# Patient Record
Sex: MALE | Race: WHITE | URBAN - METROPOLITAN AREA
[De-identification: names, ages, dates, MRNs, and addresses within clinical notes are randomized per-mention and may not be internally consistent; named-entity substitution may affect disease eponyms.]

---

## 2021-03-04 ENCOUNTER — TRANSFERRED RECORDS (OUTPATIENT)
Dept: HEALTH INFORMATION MANAGEMENT | Facility: CLINIC | Age: 61
End: 2021-03-04

## 2021-03-04 LAB — HEP C HIM: NORMAL

## 2021-03-06 ENCOUNTER — TRANSFERRED RECORDS (OUTPATIENT)
Dept: HEALTH INFORMATION MANAGEMENT | Facility: CLINIC | Age: 61
End: 2021-03-06

## 2021-03-15 ENCOUNTER — TRANSFERRED RECORDS (OUTPATIENT)
Dept: HEALTH INFORMATION MANAGEMENT | Facility: CLINIC | Age: 61
End: 2021-03-15

## 2021-04-09 ENCOUNTER — TRANSFERRED RECORDS (OUTPATIENT)
Dept: HEALTH INFORMATION MANAGEMENT | Facility: CLINIC | Age: 61
End: 2021-04-09

## 2021-05-05 ENCOUNTER — TRANSFERRED RECORDS (OUTPATIENT)
Dept: HEALTH INFORMATION MANAGEMENT | Facility: CLINIC | Age: 61
End: 2021-05-05

## 2021-05-05 LAB
ALT SERPL-CCNC: 61 U/L (ref 0–78)
AST SERPL-CCNC: 69 U/L (ref 0–37)

## 2021-05-25 ENCOUNTER — REFERRAL (OUTPATIENT)
Dept: TRANSPLANT | Facility: CLINIC | Age: 61
End: 2021-05-25

## 2021-05-25 DIAGNOSIS — K74.60 CIRRHOSIS OF LIVER (H): Primary | ICD-10-CM

## 2021-05-25 DIAGNOSIS — K81.9 CHOLECYSTITIS: ICD-10-CM

## 2021-05-25 NOTE — TELEPHONE ENCOUNTER
Referral from Dr. Arenas at [facility] (see CE) for possible choley in cirrhotic patient.     Will ask Dr. Flowers's admins to call and set up appts.    Records are in CE.

## 2021-05-25 NOTE — Clinical Note
[address]TH Floor admins to determine exact date of consult. They will contact us with date for consult. Hold tight until then. Thanks, tk

## 2021-06-14 ENCOUNTER — ANCILLARY PROCEDURE (OUTPATIENT)
Dept: ULTRASOUND IMAGING | Facility: CLINIC | Age: 61
End: 2021-06-14
Attending: TRANSPLANT SURGERY
Payer: COMMERCIAL

## 2021-06-14 ENCOUNTER — OFFICE VISIT (OUTPATIENT)
Dept: TRANSPLANT | Facility: CLINIC | Age: 61
End: 2021-06-14
Attending: TRANSPLANT SURGERY
Payer: COMMERCIAL

## 2021-06-14 VITALS
WEIGHT: 266.6 LBS | SYSTOLIC BLOOD PRESSURE: 148 MMHG | OXYGEN SATURATION: 93 % | DIASTOLIC BLOOD PRESSURE: 82 MMHG | BODY MASS INDEX: 37.18 KG/M2 | HEART RATE: 74 BPM

## 2021-06-14 DIAGNOSIS — K81.9 CHOLECYSTITIS: ICD-10-CM

## 2021-06-14 DIAGNOSIS — K74.60 CIRRHOSIS OF LIVER (H): ICD-10-CM

## 2021-06-14 LAB
ALBUMIN SERPL-MCNC: 3.4 G/DL (ref 3.4–5)
ALP SERPL-CCNC: 141 U/L (ref 40–150)
ALT SERPL W P-5'-P-CCNC: 49 U/L (ref 0–70)
ANION GAP SERPL CALCULATED.3IONS-SCNC: <1 MMOL/L (ref 3–14)
AST SERPL W P-5'-P-CCNC: 58 U/L (ref 0–45)
BILIRUB DIRECT SERPL-MCNC: 0.3 MG/DL (ref 0–0.2)
BILIRUB SERPL-MCNC: 1 MG/DL (ref 0.2–1.3)
BUN SERPL-MCNC: 6 MG/DL (ref 7–30)
CALCIUM SERPL-MCNC: 8.6 MG/DL (ref 8.5–10.1)
CHLORIDE SERPL-SCNC: 107 MMOL/L (ref 94–109)
CO2 SERPL-SCNC: 31 MMOL/L (ref 20–32)
CREAT SERPL-MCNC: 0.8 MG/DL (ref 0.66–1.25)
ERYTHROCYTE [DISTWIDTH] IN BLOOD BY AUTOMATED COUNT: 13.1 % (ref 10–15)
GFR SERPL CREATININE-BSD FRML MDRD: >90 ML/MIN/{1.73_M2}
GLUCOSE SERPL-MCNC: 115 MG/DL (ref 70–99)
HCT VFR BLD AUTO: 40.2 % (ref 40–53)
HGB BLD-MCNC: 13.6 G/DL (ref 13.3–17.7)
INR PPP: 1.28 (ref 0.86–1.14)
MCH RBC QN AUTO: 30.8 PG (ref 26.5–33)
MCHC RBC AUTO-ENTMCNC: 33.8 G/DL (ref 31.5–36.5)
MCV RBC AUTO: 91 FL (ref 78–100)
PLATELET # BLD AUTO: 79 10E9/L (ref 150–450)
POTASSIUM SERPL-SCNC: 3.6 MMOL/L (ref 3.4–5.3)
PROT SERPL-MCNC: 7.7 G/DL (ref 6.8–8.8)
RBC # BLD AUTO: 4.41 10E12/L (ref 4.4–5.9)
SODIUM SERPL-SCNC: 136 MMOL/L (ref 133–144)
WBC # BLD AUTO: 5.9 10E9/L (ref 4–11)

## 2021-06-14 PROCEDURE — 93975 VASCULAR STUDY: CPT | Performed by: RADIOLOGY

## 2021-06-14 PROCEDURE — 99203 OFFICE O/P NEW LOW 30 MIN: CPT | Performed by: TRANSPLANT SURGERY

## 2021-06-14 PROCEDURE — 85610 PROTHROMBIN TIME: CPT | Performed by: PATHOLOGY

## 2021-06-14 PROCEDURE — 36415 COLL VENOUS BLD VENIPUNCTURE: CPT | Performed by: PATHOLOGY

## 2021-06-14 PROCEDURE — 85027 COMPLETE CBC AUTOMATED: CPT | Performed by: PATHOLOGY

## 2021-06-14 PROCEDURE — 80076 HEPATIC FUNCTION PANEL: CPT | Performed by: PATHOLOGY

## 2021-06-14 PROCEDURE — 80048 BASIC METABOLIC PNL TOTAL CA: CPT | Performed by: PATHOLOGY

## 2021-06-14 RX ORDER — URSODIOL 300 MG/1
300 CAPSULE ORAL 2 TIMES DAILY
Qty: 90 CAPSULE | Refills: 3 | Status: SHIPPED | OUTPATIENT
Start: 2021-06-14 | End: 2021-07-22

## 2021-06-14 RX ORDER — PANTOPRAZOLE SODIUM 40 MG/1
TABLET, DELAYED RELEASE ORAL
COMMUNITY
Start: 2021-04-19

## 2021-06-14 NOTE — LETTER
6/14/2021         RE: Marc Hart  [address] [facility] [address]        Dear Colleague,    Thank you for referring your patient, Marc Hart, to the [facility] TRANSPLANT [facility]. Please see a copy of my visit note below.    Patient has been referred for a cholecystectomy.    Complaints: Upper abdominal discomfort on eating fatty food.      History of present illness:    Patient has noticed that he has upper abdominal discomfort when he eats food containing high content of fat.  This discomfort has been going on for almost a year.  On one occasion he had to go to the emergency room and was noted to have gallstones.  He does not give any history of fever suggesting cholangitis.  He does not give any history of pancreatitis.  He was noted to have liver disease, reported as nonalcoholic fatty steatosis.  There is no history of ascites or variceal bleeding or encephalopathy.    No past medical history on file.  No past surgical history on file.  No past surgical history on file.    Patient Active Problem List   Diagnosis     Rotator cuff strain, left, initial encounter       Current Outpatient Medications   Medication Sig Dispense Refill     ibuprofen (ADVIL,MOTRIN) 200 MG tablet Take 800 mg by mouth every 6 hours as needed for mild pain       pantoprazole (PROTONIX) 40 MG EC tablet        ursodiol (ACTIGALL) 300 MG capsule Take 1 capsule (300 mg) by mouth 2 times daily 90 capsule 3     doxycycline (VIBRAMYCIN) 100 MG capsule Take 1 capsule (100 mg) by mouth 2 times daily With food (Patient not taking: Reported on 6/14/2021) 20 capsule 0     Tadalafil (CIALIS PO) Take 5 mg by mouth          No Known Allergies    Vitals:    06/14/21 0932   BP: (!) 148/82   Pulse: 74   SpO2: 93%   Weight: 120.9 kg (266 lb 9.6 oz)     Examination abdomen:    The abdomen is soft no guarding tenderness or rigidity.      Clinical impression: Upper abdominal discomfort suspected due to cholelithiasis.    Recommendations:  Ultrasound of the liver, start ursodiol 1 tablet twice daily.    I explained to him the risk benefits and alternatives of performing an open cholecystectomy in a patient with cirrhosis.  The patient wants to try medical therapy and only if his symptoms get worse he wants to pursue surgery.  I gave him my contact information and told him to report if he has any further pain fever or any other symptoms.          Again, thank you for allowing me to participate in the care of your patient.        Sincerely,        Saeid Flowers MD

## 2021-06-14 NOTE — LETTER
Date:July 1, 2021      Patient was self referred, no letter generated. Do not send.        [facility] Health Information

## 2021-06-14 NOTE — PROGRESS NOTES
Patient has been referred for a cholecystectomy.    Complaints: Upper abdominal discomfort on eating fatty food.      History of present illness:    Patient has noticed that he has upper abdominal discomfort when he eats food containing high content of fat.  This discomfort has been going on for almost a year.  On one occasion he had to go to the emergency room and was noted to have gallstones.  He does not give any history of fever suggesting cholangitis.  He does not give any history of pancreatitis.  He was noted to have liver disease, reported as nonalcoholic fatty steatosis.  There is no history of ascites or variceal bleeding or encephalopathy.    No past medical history on file.  No past surgical history on file.  No past surgical history on file.    Patient Active Problem List   Diagnosis     Rotator cuff strain, left, initial encounter       Current Outpatient Medications   Medication Sig Dispense Refill     ibuprofen (ADVIL,MOTRIN) 200 MG tablet Take 800 mg by mouth every 6 hours as needed for mild pain       pantoprazole (PROTONIX) 40 MG EC tablet        ursodiol (ACTIGALL) 300 MG capsule Take 1 capsule (300 mg) by mouth 2 times daily 90 capsule 3     doxycycline (VIBRAMYCIN) 100 MG capsule Take 1 capsule (100 mg) by mouth 2 times daily With food (Patient not taking: Reported on 6/14/2021) 20 capsule 0     Tadalafil (CIALIS PO) Take 5 mg by mouth          No Known Allergies    Vitals:    06/14/21 0932   BP: (!) 148/82   Pulse: 74   SpO2: 93%   Weight: 120.9 kg (266 lb 9.6 oz)     Examination abdomen:    The abdomen is soft no guarding tenderness or rigidity.      Clinical impression: Upper abdominal discomfort suspected due to cholelithiasis.    Recommendations: Ultrasound of the liver, start ursodiol 1 tablet twice daily.    I explained to him the risk benefits and alternatives of performing an open cholecystectomy in a patient with cirrhosis.  The patient wants to try medical therapy and only if his  symptoms get worse he wants to pursue surgery.  I gave him my contact information and told him to report if he has any further pain fever or any other symptoms.

## 2021-07-22 DIAGNOSIS — K81.9 CHOLECYSTITIS: ICD-10-CM

## 2021-07-22 RX ORDER — URSODIOL 300 MG/1
300 CAPSULE ORAL 2 TIMES DAILY
Qty: 90 CAPSULE | Refills: 1 | Status: SHIPPED | OUTPATIENT
Start: 2021-07-22

## 2023-11-30 ENCOUNTER — TRANSFERRED RECORDS (OUTPATIENT)
Dept: HEALTH INFORMATION MANAGEMENT | Facility: CLINIC | Age: 63
End: 2023-11-30

## 2023-12-05 ENCOUNTER — TRANSFERRED RECORDS (OUTPATIENT)
Dept: HEALTH INFORMATION MANAGEMENT | Facility: CLINIC | Age: 63
End: 2023-12-05

## 2024-02-13 ENCOUNTER — TRANSFERRED RECORDS (OUTPATIENT)
Dept: HEALTH INFORMATION MANAGEMENT | Facility: CLINIC | Age: 64
End: 2024-02-13

## 2024-03-08 ENCOUNTER — TRANSFERRED RECORDS (OUTPATIENT)
Dept: HEALTH INFORMATION MANAGEMENT | Facility: CLINIC | Age: 64
End: 2024-03-08

## 2024-03-13 ENCOUNTER — TRANSFERRED RECORDS (OUTPATIENT)
Dept: HEALTH INFORMATION MANAGEMENT | Facility: CLINIC | Age: 64
End: 2024-03-13

## 2024-05-25 ENCOUNTER — TRANSFERRED RECORDS (OUTPATIENT)
Dept: HEALTH INFORMATION MANAGEMENT | Facility: CLINIC | Age: 64
End: 2024-05-25

## 2024-06-20 ENCOUNTER — TRANSFERRED RECORDS (OUTPATIENT)
Dept: HEALTH INFORMATION MANAGEMENT | Facility: CLINIC | Age: 64
End: 2024-06-20

## 2024-10-01 ENCOUNTER — TRANSFERRED RECORDS (OUTPATIENT)
Dept: HEALTH INFORMATION MANAGEMENT | Facility: CLINIC | Age: 64
End: 2024-10-01

## 2024-10-02 ENCOUNTER — TRANSFERRED RECORDS (OUTPATIENT)
Dept: HEALTH INFORMATION MANAGEMENT | Facility: CLINIC | Age: 64
End: 2024-10-02

## 2024-10-18 ENCOUNTER — TRANSFERRED RECORDS (OUTPATIENT)
Dept: HEALTH INFORMATION MANAGEMENT | Facility: CLINIC | Age: 64
End: 2024-10-18

## 2024-10-31 ENCOUNTER — TRANSFERRED RECORDS (OUTPATIENT)
Dept: HEALTH INFORMATION MANAGEMENT | Facility: CLINIC | Age: 64
End: 2024-10-31

## 2024-12-16 ENCOUNTER — TRANSFERRED RECORDS (OUTPATIENT)
Dept: HEALTH INFORMATION MANAGEMENT | Facility: CLINIC | Age: 64
End: 2024-12-16

## 2024-12-19 ENCOUNTER — TRANSFERRED RECORDS (OUTPATIENT)
Dept: HEALTH INFORMATION MANAGEMENT | Facility: CLINIC | Age: 64
End: 2024-12-19

## 2024-12-20 ENCOUNTER — TRANSFERRED RECORDS (OUTPATIENT)
Dept: HEALTH INFORMATION MANAGEMENT | Facility: CLINIC | Age: 64
End: 2024-12-20

## 2025-02-13 ENCOUNTER — TRANSFERRED RECORDS (OUTPATIENT)
Dept: HEALTH INFORMATION MANAGEMENT | Facility: CLINIC | Age: 65
End: 2025-02-13

## 2025-05-19 ENCOUNTER — MEDICAL CORRESPONDENCE (OUTPATIENT)
Dept: HEALTH INFORMATION MANAGEMENT | Facility: CLINIC | Age: 65
End: 2025-05-19
Payer: COMMERCIAL

## 2025-05-19 ENCOUNTER — TRANSFERRED RECORDS (OUTPATIENT)
Dept: HEALTH INFORMATION MANAGEMENT | Facility: CLINIC | Age: 65
End: 2025-05-19
Payer: COMMERCIAL

## 2025-05-21 ENCOUNTER — REFERRAL (OUTPATIENT)
Dept: TRANSPLANT | Facility: CLINIC | Age: 65
End: 2025-05-21

## 2025-05-21 DIAGNOSIS — K75.81 METABOLIC DYSFUNCTION-ASSOCIATED STEATOHEPATITIS (MASH): Primary | ICD-10-CM

## 2025-05-21 DIAGNOSIS — K75.81 LIVER CIRRHOSIS SECONDARY TO NASH (H): ICD-10-CM

## 2025-05-21 DIAGNOSIS — I85.00 IDIOPATHIC ESOPHAGEAL VARICES WITHOUT BLEEDING (H): ICD-10-CM

## 2025-05-21 DIAGNOSIS — K70.30 ALCOHOLIC CIRRHOSIS OF LIVER WITHOUT ASCITES (H): ICD-10-CM

## 2025-05-21 DIAGNOSIS — K70.31 ALCOHOLIC CIRRHOSIS OF LIVER WITH ASCITES (H): ICD-10-CM

## 2025-05-21 DIAGNOSIS — K74.60 LIVER CIRRHOSIS SECONDARY TO NASH (H): ICD-10-CM

## 2025-05-21 DIAGNOSIS — K76.6 PORTAL HYPERTENSION (H): ICD-10-CM

## 2025-05-22 VITALS — BODY MASS INDEX: 33.32 KG/M2 | HEIGHT: 71 IN | WEIGHT: 238 LBS

## 2025-05-28 PROBLEM — R19.5 GUAIAC POSITIVE STOOLS: Status: ACTIVE | Noted: 2023-06-13

## 2025-05-28 PROBLEM — I85.00 ESOPHAGEAL VARICES WITHOUT BLEEDING (H): Status: ACTIVE | Noted: 2025-03-25

## 2025-05-28 PROBLEM — K82.8 BILIARY DYSKINESIA: Status: ACTIVE | Noted: 2023-06-27

## 2025-05-28 PROBLEM — M48.02 IDIOPATHIC CERVICAL SPINAL STENOSIS: Status: ACTIVE | Noted: 2018-11-06

## 2025-05-28 PROBLEM — L02.611 CELLULITIS AND ABSCESS OF TOE OF RIGHT FOOT: Status: ACTIVE | Noted: 2024-07-11

## 2025-05-28 PROBLEM — D69.6 THROMBOCYTOPENIA: Status: ACTIVE | Noted: 2023-06-13

## 2025-05-28 PROBLEM — K76.6 PORTAL HYPERTENSION (H): Status: ACTIVE | Noted: 2021-03-15

## 2025-05-28 PROBLEM — K21.9 GASTROESOPHAGEAL REFLUX DISEASE WITHOUT ESOPHAGITIS: Status: ACTIVE | Noted: 2023-06-27

## 2025-05-28 PROBLEM — L03.031 CELLULITIS AND ABSCESS OF TOE OF RIGHT FOOT: Status: ACTIVE | Noted: 2024-07-11

## 2025-05-28 PROBLEM — F90.2 ATTENTION DEFICIT HYPERACTIVITY DISORDER (ADHD), COMBINED TYPE, MODERATE: Status: ACTIVE | Noted: 2024-11-05

## 2025-05-29 ENCOUNTER — PATIENT OUTREACH (OUTPATIENT)
Dept: CARE COORDINATION | Facility: CLINIC | Age: 65
End: 2025-05-29
Payer: COMMERCIAL

## 2025-05-31 ENCOUNTER — HEALTH MAINTENANCE LETTER (OUTPATIENT)
Age: 65
End: 2025-05-31

## 2025-06-05 NOTE — CONFIDENTIAL NOTE
DIAGNOSIS:  Alcoholic cirrhosis of liver without ascites    Appt Date:  06.013.2025   NOTES STATUS DETAILS   OFFICE NOTE from referring provider Internal 05.21.2025 Teresa Durant MD    OFFICE NOTES from other specialists Received 05.19.2025 Moisés Morin MD [facility]   DISCHARGE SUMMARY from hospital     MEDICATION LIST     LIVER BIOSPY (IF APPLICABLE)      PATHOLOGY REPORTS      IMAGING     ENDOSCOPY (IF AVAILABLE)     COLONOSCOPY (IF AVAILABLE)     ULTRASOUND LIVER     CT OF ABDOMEN     MRI OF LIVER     FIBROSCAN, US ELASTOGRAPHY, FIBROSIS SCAN, MR ELASTOGRAPHY     LABS     HEPATIC PANEL (LIVER PANEL)     BASIC METABOLIC PANEL     COMPLETE METABOLIC PANEL     COMPLETE BLOOD COUNT (CBC)     INTERNATIONAL NORMALIZED RATIO (INR)     HEPATITIS C ANTIBODY     HEPATITIS C VIRAL LOAD/PCR     HEPATITIS C GENOTYPE     HEPATITIS B SURFACE ANTIGEN     HEPATITIS B SURFACE ANTIBODY     HEPATITIS B DNA QUANT LEVEL     HEPATITIS B CORE ANTIBODY

## 2025-06-09 ENCOUNTER — VIRTUAL VISIT (OUTPATIENT)
Dept: GASTROENTEROLOGY | Facility: CLINIC | Age: 65
End: 2025-06-09
Attending: INTERNAL MEDICINE
Payer: COMMERCIAL

## 2025-06-09 DIAGNOSIS — K75.81 METABOLIC DYSFUNCTION-ASSOCIATED STEATOHEPATITIS (MASH): ICD-10-CM

## 2025-06-09 DIAGNOSIS — K76.6 PORTAL HYPERTENSION (H): ICD-10-CM

## 2025-06-09 DIAGNOSIS — I85.00 IDIOPATHIC ESOPHAGEAL VARICES WITHOUT BLEEDING (H): ICD-10-CM

## 2025-06-09 DIAGNOSIS — K70.30 ALCOHOLIC CIRRHOSIS OF LIVER WITHOUT ASCITES (H): ICD-10-CM

## 2025-06-09 NOTE — LETTER
6/9/2025      Marc Hart  [address]  [facility] [address]      Dear Colleague,    Thank you for referring your patient, Marc Hart, to the [facility] [place]. Please see a copy of my visit note below.    Psychosocial Assessment for Liver Transplant Evaluation  This interview was conducted via phone with Marc Hart.  Living Situation: Marc and his wife Sheela live together in a townhome in [place], [place]. They have lived at this residence for the past twelve years.  They have a mortgage which is affordable.  Marc reports he is independent with ambulation, bathing, dressing and driving.  He reports compliance with medications.  If he could not drive himself to a medical appointment he would ask his neighbors ([facility] or [address]).  Education/Employment:  Marc graduated high school and obtained a bachelor's degree in banking and finance.  He worked for over thirty years as a  before being laid off this past April.  Marc reports he is actively looking for work.    Financial /Income: Marc is receiving unemployment benefits.   His wife has income from her full-time employment for the [employer].    Health Insurance:  Marc is insured through [facility], a policy through his wife's employer.  This policy has a $400 deductible, 95/5 coinsurance and an annual $1700 maximum out of pocket.  Marc does not currently have coverage for the prescription Taltx, used to treat psoriasis.  His dermatologist is providing samples.  This writer talked with Marc about the financial risks of transplant, particularly about the high cost of transplant related medications and the importance of maintaining adequate health insurance coverage.  Family/Social Support: Marc and his wife Sheela have been  for twelve years.  Marc was  prior to this relationship.  Marc reports Sheela works approximately 100 hours per week.  She is a nurse by  trade.  Marc is unsure if she would be a part of his post transplant care giving. He is seeing a therapist currently due to feeling Sheela lacks empathy towards him and his medical situation.  Marc has three adult children.  Anabelle lives in [place], [place].  Martin is in the process of transitioning.  Marc does not have a relationship with his son Vick, and they have not had contact in twelve years.  Marc's parents are .  He has two sisters living in [place] and [place].  He is unsure if his sisters would be able/willing to assist with care giving.  This writer stressed the importance of having a stable and involved support network before and after transplant.  Provided Marc with education about the relationship between a stable support system and better surgical and post-transplant outcomes compared to patients with a limited support system.    Chemical Dependency:  [facility] reports a history of using tobacco products, and quit smoking over twenty-five years ago.  He also has a history of recreational use of illicit drugs (hash, cocaine, acid) over thirty years ago.      Marc's drug of choice was alcohol, and he has been sober for over thirty-one years.  He remains engaged in AA three times per week.  He has no history of treatment or legal consequences of his use.   Mental Health: Marc has a history of depression, anxiety, insomnia and ADHD.  He has been under the care of [facility] (Ban Woodard, [place], notes in care everywhere) since May of 2024.  He has a history of one suicide attempt at the age of [age] by medication overdose which required hospitalization.  Marc was forthcoming about this attempt, and it is also documented in psychiatry records.  He denies any recent suicidal ideation. Marc is currently prescribed Vyvanse and Venlafaxine.  Marc also established care with a mental health therapist at [facility] (Zoë Ornelas) three weeks ago and he is planning to see this provider  weekly.  Adjustment to Illness:  Marc is having difficulty adjusting to his medical situation, and he feels unsupported at home. He reports that is why he started seeing a mental health therapist.  This writer provided Marc with supportive counseling throughout this interview.  This writer also encouraged Marc to attend the liver transplant support group for additional support and encouragement.   Impression/Recommendations:   Marc verbalizes understanding the psychosocial risks of transplant and teaching provided during this evaluation.  The Caregiver Agreement for Liver Transplantation was discussed and mailed to patient.  He does not have a confirmed post transplant care giving plan and this will need to be rediscussed if Marc returns for a full evaluation.  Marc has been sober from alcohol and illict drug foro over thirty years.  He also quit smoking over twenty-five years ago.  He meets our sobriety criteria recommended for listing.  Marc has a history of alcohol dependency and he attends AA meetings three times per week.  Marc also has a history of depression, anxiety, insomnia and ADHD and is under the care of both psychiatry and a mental health therapist.  He plans to continue seeing these providers.  Marc has adequate finances and health insurance for transplant.  This writer will remain available to assist patient throughout the evaluation process and will follow patient through transplant if he is listed.  It was a pleasure to evaluate this patient for liver transplant.   Teaching completed during assessment:  1.     Housing and relocation needs post transplant.  2.     Caregiver needs post transplant.  3.     Financial issues related to transplant.  4.     Risks of alcohol use post transplant.  5.     Common psychosocial stressors pre/post transplant.         6.     Liver Transplant support group availability.         7.     Advanced Health Care Directive-education provided, form mailed to  patient             Psychosocial Risks of Transplant Reviewed:  1.     Increased stress related to your emotional, family, social, employment, or   financial situation.  2.     Affect on work and/or disability benefits.  3.     Affect on future health and life insurance.  4.     Transplant outcome expectations may not be met.  5.     Mental Health risks: anxiety, depression, PTSD, guilt, grief and chronic fatigue.     RUBÉN Savage, LICSW        Again, thank you for allowing me to participate in the care of your patient.        Sincerely,         HEPATOLOGY SW    Electronically signed

## 2025-06-09 NOTE — PROGRESS NOTES
Psychosocial Assessment for Liver Transplant Evaluation  This interview was conducted via phone with Marc Hart.  Living Situation: Marc and his wife Sheela live together in a townhome in [place], [place]. They have lived at this residence for the past twelve years.  They have a mortgage which is affordable.  Marc reports he is independent with ambulation, bathing, dressing and driving.  He reports compliance with medications.  If he could not drive himself to a medical appointment he would ask his neighbors ([employer] or [address]).  Education/Employment:  Marc graduated high school and obtained a bachelor's degree in banking and finance.  He worked for over thirty years as a  before being laid off this past April.  Marc reports he is actively looking for work.    Financial /Income: Marc is receiving unemployment benefits.   His wife has income from her full-time employment for the [employer].    Health Insurance:  Marc is insured through [facility], a policy through his wife's employer.  This policy has a $400 deductible, 95/5 coinsurance and an annual $1700 maximum out of pocket.  Marc does not currently have coverage for the prescription Taltx, used to treat psoriasis.  His dermatologist is providing samples.  This writer talked with Marc about the financial risks of transplant, particularly about the high cost of transplant related medications and the importance of maintaining adequate health insurance coverage.  Family/Social Support: Marc and his wife Sheela have been  for twelve years.  Marc was  prior to this relationship.  Marc reports Sheela works approximately 100 hours per week.  She is a nurse by [employer].  Marc is unsure if she would be a part of his post transplant care giving. He is seeing a therapist currently due to feeling Sheela lacks empathy towards him and his medical situation.  Marc has three adult children.  [place]abelle lives in [facility]  [place].  Martin is in the process of transitioning.  Marc does not have a relationship with his son Vick, and they have not had contact in twelve years.  Marc's parents are .  He has two sisters living in [place] and [place].  He is unsure if his sisters would be able/willing to assist with care giving.  This writer stressed the importance of having a stable and involved support network before and after transplant.  Provided Marc with education about the relationship between a stable support system and better surgical and post-transplant outcomes compared to patients with a limited support system.    Chemical Dependency:  Marc reports a history of using tobacco products, and quit smoking over twenty-five years ago.  He also has a history of recreational use of illicit drugs (hash, cocaine, acid) over thirty years ago.      Marc's drug of choice was alcohol, and he has been sober for over thirty-one years.  He remains engaged in AA three times per week.  He has no history of treatment or legal consequences of his use.   Mental Health: Marc has a history of depression, anxiety, insomnia and ADHD.  He has been under the care of [facility] (Ban Woodard, [place], notes in care everywhere) since May of 2024.  He has a history of one suicide attempt at the age of [age] by medication overdose which required hospitalization.  Marc was forthcoming about this attempt, and it is also documented in psychiatry records.  He denies any recent suicidal ideation. Marc is currently prescribed Vyvanse and Venlafaxine.  Marc also established care with a mental health therapist at [facility] (Zoë Ornelas) three weeks ago and he is planning to see this provider weekly.  Adjustment to Illness:  Marc is having difficulty adjusting to his medical situation, and he feels unsupported at home. He reports that is why he started seeing a mental health therapist.  This writer provided Marc with supportive counseling  throughout this interview.  This writer also encouraged Marc to attend the liver transplant support group for additional support and encouragement.   Impression/Recommendations:   Marc verbalizes understanding the psychosocial risks of transplant and teaching provided during this evaluation.  The Caregiver Agreement for Liver Transplantation was discussed and mailed to patient.  He does not have a confirmed post transplant care giving plan and this will need to be rediscussed if Marc returns for a full evaluation.  Marc has been sober from alcohol and illict drug foro over thirty years.  He also quit smoking over twenty-five years ago.  He meets our sobriety criteria recommended for listing.  Marc has a history of alcohol dependency and he attends AA meetings three times per week.  Marc also has a history of depression, anxiety, insomnia and ADHD and is under the care of both psychiatry and a mental health therapist.  He plans to continue seeing these providers.  Marc has adequate finances and health insurance for transplant.  This writer will remain available to assist patient throughout the evaluation process and will follow patient through transplant if he is listed.  It was a pleasure to evaluate this patient for liver transplant.   Teaching completed during assessment:  1.     Housing and relocation needs post transplant.  2.     Caregiver needs post transplant.  3.     Financial issues related to transplant.  4.     Risks of alcohol use post transplant.  5.     Common psychosocial stressors pre/post transplant.         6.     Liver Transplant support group availability.         7.     Advanced Health Care Directive-education provided, form mailed to patient             Psychosocial Risks of Transplant Reviewed:  1.     Increased stress related to your emotional, family, social, employment, or   financial situation.  2.     Affect on work and/or disability benefits.  3.     Affect on future health and  life insurance.  4.     Transplant outcome expectations may not be met.  5.     Mental Health risks: anxiety, depression, PTSD, guilt, grief and chronic fatigue.     RUBÉN Savage, LICSW

## 2025-06-11 LAB
ABO + RH BLD: NORMAL
BLD GP AB SCN SERPL QL: NEGATIVE
SPECIMEN EXP DATE BLD: NORMAL

## 2025-06-12 ENCOUNTER — LAB (OUTPATIENT)
Dept: LAB | Facility: CLINIC | Age: 65
End: 2025-06-12
Attending: INTERNAL MEDICINE
Payer: COMMERCIAL

## 2025-06-12 DIAGNOSIS — K76.6 PORTAL HYPERTENSION (H): ICD-10-CM

## 2025-06-12 DIAGNOSIS — K75.81 METABOLIC DYSFUNCTION-ASSOCIATED STEATOHEPATITIS (MASH): ICD-10-CM

## 2025-06-12 DIAGNOSIS — I85.00 IDIOPATHIC ESOPHAGEAL VARICES WITHOUT BLEEDING (H): ICD-10-CM

## 2025-06-12 DIAGNOSIS — K70.30 ALCOHOLIC CIRRHOSIS OF LIVER WITHOUT ASCITES (H): ICD-10-CM

## 2025-06-12 LAB
ALBUMIN UR-MCNC: NEGATIVE MG/DL
APPEARANCE UR: CLEAR
BACTERIA #/AREA URNS HPF: ABNORMAL /HPF
BILIRUB UR QL STRIP: ABNORMAL
CMV IGG SERPL IA-ACNC: <0.2 U/ML
CMV IGG SERPL IA-ACNC: NORMAL
COLOR UR AUTO: YELLOW
EBV VCA IGG SER IA-ACNC: 546 U/ML
EBV VCA IGG SER IA-ACNC: POSITIVE
ERYTHROCYTE [DISTWIDTH] IN BLOOD BY AUTOMATED COUNT: 14.5 % (ref 10–15)
GLUCOSE UR STRIP-MCNC: NEGATIVE MG/DL
HCT VFR BLD AUTO: 32.7 % (ref 40–53)
HGB BLD-MCNC: 11.2 G/DL (ref 13.3–17.7)
HGB UR QL STRIP: ABNORMAL
KETONES UR STRIP-MCNC: NEGATIVE MG/DL
LEUKOCYTE ESTERASE UR QL STRIP: NEGATIVE
MCH RBC QN AUTO: 32.7 PG (ref 26.5–33)
MCHC RBC AUTO-ENTMCNC: 34.3 G/DL (ref 31.5–36.5)
MCV RBC AUTO: 96 FL (ref 78–100)
MUCOUS THREADS #/AREA URNS LPF: PRESENT /LPF
NITRATE UR QL: NEGATIVE
PH UR STRIP: 7 [PH] (ref 5–7)
PLATELET # BLD AUTO: 52 10E3/UL (ref 150–450)
RBC # BLD AUTO: 3.42 10E6/UL (ref 4.4–5.9)
RBC #/AREA URNS AUTO: ABNORMAL /HPF
SP GR UR STRIP: 1.01 (ref 1–1.03)
SQUAMOUS #/AREA URNS AUTO: ABNORMAL /LPF
T PALLIDUM AB SER QL: NONREACTIVE
UROBILINOGEN UR STRIP-ACNC: >=8 E.U./DL
WBC # BLD AUTO: 4.8 10E3/UL (ref 4–11)
WBC #/AREA URNS AUTO: ABNORMAL /HPF

## 2025-06-13 ENCOUNTER — RESULTS FOLLOW-UP (OUTPATIENT)
Dept: TRANSPLANT | Facility: CLINIC | Age: 65
End: 2025-06-13

## 2025-06-13 ENCOUNTER — PRE VISIT (OUTPATIENT)
Dept: GASTROENTEROLOGY | Facility: CLINIC | Age: 65
End: 2025-06-13
Payer: COMMERCIAL

## 2025-06-23 ENCOUNTER — OFFICE VISIT (OUTPATIENT)
Dept: TRANSPLANT | Facility: CLINIC | Age: 65
End: 2025-06-23
Attending: TRANSPLANT SURGERY
Payer: COMMERCIAL

## 2025-06-23 VITALS
HEART RATE: 59 BPM | BODY MASS INDEX: 34.09 KG/M2 | SYSTOLIC BLOOD PRESSURE: 122 MMHG | WEIGHT: 241 LBS | OXYGEN SATURATION: 98 % | DIASTOLIC BLOOD PRESSURE: 57 MMHG

## 2025-06-23 DIAGNOSIS — K80.20 CALCULUS OF GALLBLADDER WITHOUT CHOLECYSTITIS WITHOUT OBSTRUCTION: Primary | ICD-10-CM

## 2025-06-23 PROCEDURE — 99204 OFFICE O/P NEW MOD 45 MIN: CPT | Performed by: TRANSPLANT SURGERY

## 2025-06-23 PROCEDURE — 99213 OFFICE O/P EST LOW 20 MIN: CPT | Performed by: TRANSPLANT SURGERY

## 2025-06-23 NOTE — LETTER
6/23/2025      Marc Hart  [address] [facility] [address]      Dear Colleague,    Thank you for referring your patient, Marc Hart, to the [facility] TRANSPLANT CLINIC. Please see a copy of my visit note below.    MELD 3.0: 16 at 6/12/2025  2:59 PM  MELD-Na: 17 at 6/12/2025  2:59 PM  Calculated from:  Serum Creatinine: 0.69 mg/dL (Using min of 1 mg/dL) at 6/12/2025  2:59 PM  Serum Sodium: 138 mmol/L (Using max of 137 mmol/L) at 6/12/2025  2:59 PM  Total Bilirubin: 1.9 mg/dL at 6/12/2025  2:59 PM  Serum Albumin: 2.8 g/dL at 6/12/2025  2:59 PM  INR(ratio): 1.97 at 6/12/2025  2:59 PM  Age at listing (hypothetical): 64 years  Sex: Male at 6/12/2025  2:59 PM     Chronic cholecystitisHPI      ROS      Physical Exam    Patient has been diagnosed to have cirrhosis in 2021.  He had mild ascites.  No variceal bleeding or encephalopathy.  He was also noted to have symptomatic gallstones in 2021 and put on conservative management.  In the recent 6 months he is symptoms have worsened.  He is having repeated abdominal pain almost weekly which is affecting his quality of life.  No jaundice.  No fever suggestive of cholangitis.  No past medical history on file.  No past surgical history on file.  No past surgical history on file.    Patient Active Problem List   Diagnosis     Rotator cuff strain, left, initial encounter     Alcoholic cirrhosis of liver without ascites (H)     Aftercare following surgery of the musculoskeletal system     Attention deficit hyperactivity disorder (ADHD), combined type, moderate     Biliary dyskinesia     Cellulitis and abscess of toe of right foot     Esophageal varices without bleeding (H)     Gastroesophageal reflux disease without esophagitis     Guaiac positive stools     Idiopathic cervical spinal stenosis     Portal hypertension (H)     Thrombocytopenia       Current Outpatient Medications   Medication Sig Dispense Refill     ibuprofen (ADVIL,MOTRIN) 200 MG tablet Take 800 mg  "by mouth every 6 hours as needed for mild pain       pantoprazole (PROTONIX) 40 MG EC tablet        Tadalafil (CIALIS PO) Take 5 mg by mouth       ursodiol (ACTIGALL) 300 MG capsule Take 1 capsule (300 mg) by mouth 2 times daily 90 capsule 1     doxycycline (VIBRAMYCIN) 100 MG capsule Take 1 capsule (100 mg) by mouth 2 times daily With food (Patient not taking: Reported on 6/23/2025) 20 capsule 0          Allergies   Allergen Reactions     Adhesive Tape      Other Reaction(s): Contact Dermatitis    Tape and bandaids, tears skin.  Back surgery 11/18-tape over eyelids tore skin when removed.       Vitals:    06/23/25 1131   BP: 122/57   Pulse: 59   SpO2: 98%   Weight: 109.3 kg (241 lb)       Vital signs:                      Weight: 109.3 kg (241 lb)  Estimated body mass index is 34.09 kg/m  as calculated from the following:    Height as of 5/22/25: 1.791 m (5' 10.5\").    Weight as of this encounter: 109.3 kg (241 lb).     Abdomen soft, tenderness on the RUQ    Clinical Impression    Cholelithiasis symptomatic  Laënnec cirrhosis    Plan:  Cholecystecomy    Risk  benefits and alternatives of cholecystecomy including risk of liver decompensation explained              Again, thank you for allowing me to participate in the care of your patient.        Sincerely,        Saeid Flowers MD    Electronically signed"

## 2025-06-23 NOTE — PROGRESS NOTES
MELD 3.0: 16 at 6/12/2025  2:59 PM  MELD-Na: 17 at 6/12/2025  2:59 PM  Calculated from:  Serum Creatinine: 0.69 mg/dL (Using min of 1 mg/dL) at 6/12/2025  2:59 PM  Serum Sodium: 138 mmol/L (Using max of 137 mmol/L) at 6/12/2025  2:59 PM  Total Bilirubin: 1.9 mg/dL at 6/12/2025  2:59 PM  Serum Albumin: 2.8 g/dL at 6/12/2025  2:59 PM  INR(ratio): 1.97 at 6/12/2025  2:59 PM  Age at listing (hypothetical): 64 years  Sex: Male at 6/12/2025  2:59 PM     Chronic cholecystitisHPI      ROS      Physical Exam    Patient has been diagnosed to have cirrhosis in 2021.  He had mild ascites.  No variceal bleeding or encephalopathy.  He was also noted to have symptomatic gallstones in 2021 and put on conservative management.  In the recent 6 months he is symptoms have worsened.  He is having repeated abdominal pain almost weekly which is affecting his quality of life.  No jaundice.  No fever suggestive of cholangitis.  No past medical history on file.  No past surgical history on file.  No past surgical history on file.    Patient Active Problem List   Diagnosis    Rotator cuff strain, left, initial encounter    Alcoholic cirrhosis of liver without ascites (H)    Aftercare following surgery of the musculoskeletal system    Attention deficit hyperactivity disorder (ADHD), combined type, moderate    Biliary dyskinesia    Cellulitis and abscess of toe of right foot    Esophageal varices without bleeding (H)    Gastroesophageal reflux disease without esophagitis    Guaiac positive stools    Idiopathic cervical spinal stenosis    Portal hypertension (H)    Thrombocytopenia       Current Outpatient Medications   Medication Sig Dispense Refill    ibuprofen (ADVIL,MOTRIN) 200 MG tablet Take 800 mg by mouth every 6 hours as needed for mild pain      pantoprazole (PROTONIX) 40 MG EC tablet       Tadalafil (CIALIS PO) Take 5 mg by mouth      ursodiol (ACTIGALL) 300 MG capsule Take 1 capsule (300 mg) by mouth 2 times daily 90 capsule 1     "doxycycline (VIBRAMYCIN) 100 MG capsule Take 1 capsule (100 mg) by mouth 2 times daily With food (Patient not taking: Reported on 6/23/2025) 20 capsule 0          Allergies   Allergen Reactions    Adhesive Tape      Other Reaction(s): Contact Dermatitis    Tape and bandaids, tears skin.  Back surgery 11/18-tape over eyelids tore skin when removed.       Vitals:    06/23/25 1131   BP: 122/57   Pulse: 59   SpO2: 98%   Weight: 109.3 kg (241 lb)       Vital signs:                      Weight: 109.3 kg (241 lb)  Estimated body mass index is 34.09 kg/m  as calculated from the following:    Height as of 5/22/25: 1.791 m (5' 10.5\").    Weight as of this encounter: 109.3 kg (241 lb).     Abdomen soft, tenderness on the RUQ    Clinical Impression    Cholelithiasis symptomatic  Laënnec cirrhosis    Plan:  Cholecystecomy    Risk  benefits and alternatives of cholecystecomy including risk of liver decompensation explained            "

## 2025-07-01 ENCOUNTER — HOSPITAL ENCOUNTER (INPATIENT)
Facility: CLINIC | Age: 65
Setting detail: SURGERY ADMIT
End: 2025-07-01
Attending: TRANSPLANT SURGERY | Admitting: TRANSPLANT SURGERY
Payer: COMMERCIAL

## 2025-07-01 ENCOUNTER — TELEPHONE (OUTPATIENT)
Dept: TRANSPLANT | Facility: CLINIC | Age: 65
End: 2025-07-01
Payer: COMMERCIAL

## 2025-07-01 ENCOUNTER — PREP FOR PROCEDURE (OUTPATIENT)
Dept: TRANSPLANT | Facility: CLINIC | Age: 65
End: 2025-07-01

## 2025-07-01 DIAGNOSIS — K80.20 CALCULUS OF GALLBLADDER WITHOUT CHOLECYSTITIS WITHOUT OBSTRUCTION: Primary | ICD-10-CM

## 2025-07-01 DIAGNOSIS — K80.20 GALLSTONES: Primary | ICD-10-CM

## 2025-07-01 NOTE — TELEPHONE ENCOUNTER
FUTURE VISIT INFORMATION      SURGERY INFORMATION:  Date: Unk  Location: [facility]  Surgeon:  Saeid Flowers MD   Anesthesia Type:  Unk  Procedure: dx: Calculus of gallbladder without cholecystitis without obstruction    Consult: 25    RECORDS REQUESTED FROM:       Primary Care Provider: Linda Aguayo MD    Pertinent Medical History: Esophageal varices without bleeding, Elevated liver enzymes, Biliary dyskinesia, GERD, Hypotension, Alcoholic cirrhosis of liver without ascites, Anemia, Thrombocytopenia, Portal HTN,     Most recent EKG+ Tracin25, internal    Most recent ECHO: 10/25/24, Ela    Most recent Cardiac ECHO Stress Test: 25, Ela

## 2025-07-02 ENCOUNTER — PRE VISIT (OUTPATIENT)
Dept: SURGERY | Facility: CLINIC | Age: 65
End: 2025-07-02

## 2025-07-02 DIAGNOSIS — Z01.818 PREOP EXAMINATION: ICD-10-CM

## 2025-07-02 DIAGNOSIS — K80.20 CALCULUS OF GALLBLADDER WITHOUT CHOLECYSTITIS WITHOUT OBSTRUCTION: Primary | ICD-10-CM

## 2025-07-03 NOTE — CONFIDENTIAL NOTE
FUTURE VISIT INFORMATION      SURGERY INFORMATION:  Date:   Location: [facility] OR   Surgeon:  Saeid Flowers MD   Anesthesia Type:  General with Block   Procedure: CHOLECYSTECTOMY, OPEN   Consult:  2025 - Saeid Flowers MD     RECORDS REQUESTED FROM:       Primary Care Provider: Linda Aguayo MD    Pertinent Medical History: Esophageal varices without bleeding, Biliary dyskinesia, Alcoholic cirrhosis of liver without ascites,     Most recent EKG+ Tracin2025    Most recent ECHO: 10.25.2024     Most recent Cardiac Stress Test: 25, Ela

## 2025-07-09 ENCOUNTER — TELEPHONE (OUTPATIENT)
Dept: TRANSPLANT | Facility: CLINIC | Age: 65
End: 2025-07-09

## 2025-07-09 ENCOUNTER — PRE VISIT (OUTPATIENT)
Dept: SURGERY | Facility: CLINIC | Age: 65
End: 2025-07-09

## 2025-07-09 NOTE — TELEPHONE ENCOUNTER
Received a call from patient. He had a surgery consult with Dr. Flowers for a cholecystomy and his understanding is that he cannot have this surgery.  He is wondering what the next steps are for his care.    Message will be sent to Sherwin Rogers RN transplant coordinator, Dr. Folwers and Dr. Moreland.      RUBÉN Savage, [facility]  Liver Transplant   [facility]  Phone: [phone]  Available on Vocera:  Liver GI Intestinal Transplant A thru F SW

## 2025-07-14 ENCOUNTER — TELEPHONE (OUTPATIENT)
Dept: TRANSPLANT | Facility: CLINIC | Age: 65
End: 2025-07-14
Payer: COMMERCIAL

## 2025-07-14 NOTE — PROGRESS NOTES
Transplant Social Work Services Phone Call      Data: I completed a psychosocial assessment with Marc on 6/9/25.  Intervention: I attempted to reach Marc for psychosocial follow up.  I left him a voice message with my contact and availability.  Assessment: The following psychosocial areas need to be assessed.    Education provided by CAROLANN: deferred  Plan: I am awaiting a return call from Marc.  I will also be scheduled to meet with Marc in August as part of his liver transplant evaluation, and the above can be evaluated at that time.      RUBÉN Savage, [facility]  Liver Transplant   [facility] Acute Care Management  Phone: [phone]  Available on Vocera:  Liver GI Intestinal Transplant A thru F CAROLANN     Addendum on 7/14/25: I received a call back from patient. His largest concern is pain near/around his gall bladder.  He now anticipates coming in for liver transplant evaluation tomorrow.  He reports his wife Sheela will not attend appointments with him.  I did encourage him to ask her to be available by phone for some/all of his appointments.    1) Health Care Directive.  Incomplete.  2) Caregiver Plan for Liver Transplantation. Incomplete.  3) Mental health.  Will be assessed in perosn.

## 2025-07-22 NOTE — TELEPHONE ENCOUNTER
RECORDS RECEIVED FROM:    DATE RECEIVED:    NOTES STATUS DETAILS   OFFICE NOTE from referring provider  Internal SOT   OFFICE NOTE from other cardiologists  N/A    RECORDS from hospital/ED N/A    MEDICATION LIST Internal    GENERAL CARDIO RECORDS   (ALL APPOINTMENT TYPES)     LABS (CBC,BMP,CMP, TSH) Internal    EKG (STRIPS & REPORTS) In process 12/16/24 requested   MONITORS (STRIPS & REPORTS) N/A    ECHOS (IMAGES AND REPORTS) In process 10/25/24 requested   STRESS TESTS (IMAGES AND REPORTS) In process 1/31/25 requested   cMRI (IMAGES AND REPORTS) In process 1/31/25 requested   Cardiac cath (IMAGES AND REPORTS) N/A    CT/CTA (IMAGES AND REPORTS) N/A      Action 7/22/25 [facility]  Fax #[phone]   Action Taken Requested:  cMRI Stress  Echo  EKG Strips 1/31/25  10/25/24  12/16/24, 10/1/24          denies pain/discomfort

## 2025-07-23 ENCOUNTER — TELEPHONE (OUTPATIENT)
Dept: TRANSPLANT | Facility: CLINIC | Age: 65
End: 2025-07-23
Payer: COMMERCIAL

## 2025-07-23 NOTE — PROGRESS NOTES
Transplant Social Work Services Phone Call      Data: Marc is being evaluated for a liver transplant. His evaluation appointments are on August 12.  Intervention: I received a call from patient requesting assistance with FMLA paperwork for his wife's employer.  Assessment: Marc reports his wife is now planning to take time off work to assist with care giving at the time of transplant.  He does not believe she will be attending his appointments with him on August 12.  I strongly advised Marc to bring a care giver to those appointments, especially for appointments with transplant surgery, dietician and social work.  Plan: Marc will email me his wife's FMLA paperwork and I will pass it onto his pre transplant coordinator.    RUBÉN Savage, [facility]  Liver Transplant   [facility] Acute Care Management  Phone: [phone]  Available on Vocera:  Liver GI Intestinal Transplant A thru F SW

## 2025-07-29 DIAGNOSIS — A49.8 CLOSTRIDIUM DIFFICILE INFECTION: Primary | ICD-10-CM

## 2025-08-04 DIAGNOSIS — A49.8 CLOSTRIDIUM DIFFICILE INFECTION: Primary | ICD-10-CM

## 2025-08-11 LAB
ABO + RH BLD: NORMAL
BLD GP AB SCN SERPL QL: NEGATIVE
SPECIMEN EXP DATE BLD: NORMAL

## 2025-08-12 ENCOUNTER — ANCILLARY PROCEDURE (OUTPATIENT)
Dept: ULTRASOUND IMAGING | Facility: CLINIC | Age: 65
End: 2025-08-12
Attending: INTERNAL MEDICINE
Payer: COMMERCIAL

## 2025-08-12 ENCOUNTER — ALLIED HEALTH/NURSE VISIT (OUTPATIENT)
Dept: TRANSPLANT | Facility: CLINIC | Age: 65
End: 2025-08-12
Attending: INTERNAL MEDICINE
Payer: COMMERCIAL

## 2025-08-12 ENCOUNTER — LAB (OUTPATIENT)
Dept: LAB | Facility: CLINIC | Age: 65
End: 2025-08-12
Attending: INTERNAL MEDICINE
Payer: COMMERCIAL

## 2025-08-12 ENCOUNTER — ANCILLARY PROCEDURE (OUTPATIENT)
Dept: BONE DENSITY | Facility: CLINIC | Age: 65
End: 2025-08-12
Attending: INTERNAL MEDICINE
Payer: COMMERCIAL

## 2025-08-12 ENCOUNTER — ANCILLARY PROCEDURE (OUTPATIENT)
Dept: GENERAL RADIOLOGY | Facility: CLINIC | Age: 65
End: 2025-08-12
Attending: INTERNAL MEDICINE
Payer: COMMERCIAL

## 2025-08-12 VITALS
HEART RATE: 87 BPM | OXYGEN SATURATION: 97 % | DIASTOLIC BLOOD PRESSURE: 75 MMHG | WEIGHT: 238.7 LBS | BODY MASS INDEX: 33.42 KG/M2 | HEIGHT: 71 IN | SYSTOLIC BLOOD PRESSURE: 125 MMHG

## 2025-08-12 DIAGNOSIS — K74.60 LIVER CIRRHOSIS SECONDARY TO NASH (H): ICD-10-CM

## 2025-08-12 DIAGNOSIS — K70.30 ALCOHOLIC CIRRHOSIS OF LIVER WITHOUT ASCITES (H): Primary | ICD-10-CM

## 2025-08-12 DIAGNOSIS — K75.81 LIVER CIRRHOSIS SECONDARY TO NASH (H): ICD-10-CM

## 2025-08-12 DIAGNOSIS — K70.31 ALCOHOLIC CIRRHOSIS OF LIVER WITH ASCITES (H): ICD-10-CM

## 2025-08-12 DIAGNOSIS — I85.00 IDIOPATHIC ESOPHAGEAL VARICES WITHOUT BLEEDING (H): ICD-10-CM

## 2025-08-12 DIAGNOSIS — K76.6 PORTAL HYPERTENSION (H): ICD-10-CM

## 2025-08-12 DIAGNOSIS — K70.30 ALCOHOLIC CIRRHOSIS OF LIVER WITHOUT ASCITES (H): ICD-10-CM

## 2025-08-12 DIAGNOSIS — K75.81 METABOLIC DYSFUNCTION-ASSOCIATED STEATOHEPATITIS (MASH): ICD-10-CM

## 2025-08-12 DIAGNOSIS — K80.20 CALCULUS OF GALLBLADDER WITHOUT CHOLECYSTITIS WITHOUT OBSTRUCTION: ICD-10-CM

## 2025-08-12 DIAGNOSIS — K80.20 GALLSTONES: ICD-10-CM

## 2025-08-12 DIAGNOSIS — Z76.82 AWAITING ORGAN TRANSPLANT STATUS: Primary | ICD-10-CM

## 2025-08-12 LAB
A1 AB TITR SERPL: >256 {TITER}
ALBUMIN MFR UR ELPH: 29.5 MG/DL
ALBUMIN SERPL BCG-MCNC: 2.7 G/DL (ref 3.5–5.2)
ALBUMIN UR-MCNC: 20 MG/DL
ALP SERPL-CCNC: 140 U/L (ref 40–150)
ALT SERPL W P-5'-P-CCNC: 32 U/L (ref 0–70)
ANION GAP SERPL CALCULATED.3IONS-SCNC: 7 MMOL/L (ref 7–15)
ANTIBODY TITER IGM SCREEN: NEGATIVE
APPEARANCE UR: CLEAR
AST SERPL W P-5'-P-CCNC: 69 U/L (ref 0–45)
B IGG TITR SERPL: 128 {TITER}
B IGM TITR SERPL: 64 {TITER}
BILIRUB SERPL-MCNC: 2.9 MG/DL
BILIRUB UR QL STRIP: NEGATIVE
BILIRUBIN DIRECT (ROCHE PRO & PURE): 1.32 MG/DL (ref 0–0.45)
BUN SERPL-MCNC: 9.3 MG/DL (ref 8–23)
CALCIUM SERPL-MCNC: 8.1 MG/DL (ref 8.8–10.4)
CHLORIDE SERPL-SCNC: 105 MMOL/L (ref 98–107)
CHOLEST SERPL-MCNC: 120 MG/DL
COLOR UR AUTO: YELLOW
CREAT SERPL-MCNC: 0.77 MG/DL (ref 0.67–1.17)
CREAT UR-MCNC: 345 MG/DL
DLCOCOR-%PRED-PRE: 90 %
DLCOCOR-PRE: 25.34 ML/MIN/MMHG
DLCOUNC-%PRED-PRE: 78 %
DLCOUNC-PRE: 21.98 ML/MIN/MMHG
DLCOUNC-PRED: 27.88 ML/MIN/MMHG
EGFRCR SERPLBLD CKD-EPI 2021: >90 ML/MIN/1.73M2
ERV-%PRED-PRE: 45 %
ERV-PRE: 0.62 L
ERV-PRED: 1.37 L
ERYTHROCYTE [DISTWIDTH] IN BLOOD BY AUTOMATED COUNT: 15.8 % (ref 10–15)
EXPTIME-PRE: 8.25 SEC
FASTING STATUS PATIENT QL REPORTED: YES
FASTING STATUS PATIENT QL REPORTED: YES
FEF2575-%PRED-PRE: 151 %
FEF2575-PRE: 4.14 L/SEC
FEF2575-PRED: 2.74 L/SEC
FEFMAX-%PRED-PRE: 111 %
FEFMAX-PRE: 10.31 L/SEC
FEFMAX-PRED: 9.23 L/SEC
FEV1-%PRED-PRE: 100 %
FEV1-PRE: 3.41 L
FEV1FEV6-PRE: 85 %
FEV1FEV6-PRED: 79 %
FEV1FVC-PRE: 83 %
FEV1FVC-PRED: 77 %
FEV1SVC-PRE: 99 L
FEV1SVC-PRED: 67 L
FIFMAX-PRE: 4.47 L/SEC
FRCPLETH-%PRED-PRE: 84 %
FRCPLETH-PRE: 3.25 L
FRCPLETH-PRED: 3.86 L
FVC-%PRED-PRE: 92 %
FVC-PRE: 4.1 L
FVC-PRED: 4.43 L
GAW-PRED: 1.03 L/S/CMH2O
GLUCOSE SERPL-MCNC: 122 MG/DL (ref 70–99)
GLUCOSE UR STRIP-MCNC: NEGATIVE MG/DL
HCO3 SERPL-SCNC: 26 MMOL/L (ref 22–29)
HCT VFR BLD AUTO: 30.6 % (ref 40–53)
HDLC SERPL-MCNC: 54 MG/DL
HGB BLD-MCNC: 10.3 G/DL (ref 13.3–17.7)
HGB UR QL STRIP: ABNORMAL
HYALINE CASTS: 3 /LPF
IC-%PRED-PRE: 76 %
IC-PRE: 2.81 L
IC-PRED: 3.7 L
INR PPP: 1.98 (ref 0.85–1.15)
KETONES UR STRIP-MCNC: 10 MG/DL
LDLC SERPL CALC-MCNC: 51 MG/DL
LEUKOCYTE ESTERASE UR QL STRIP: NEGATIVE
Lab: 107 %
MCH RBC QN AUTO: 32.3 PG (ref 26.5–33)
MCHC RBC AUTO-ENTMCNC: 33.7 G/DL (ref 31.5–36.5)
MCV RBC AUTO: 96 FL (ref 78–100)
MUCOUS THREADS #/AREA URNS LPF: PRESENT /LPF
NITRATE UR QL: NEGATIVE
NONHDLC SERPL-MCNC: 66 MG/DL
PH UR STRIP: 6 [PH] (ref 5–7)
PHOSPHATE SERPL-MCNC: 2.8 MG/DL (ref 2.5–4.5)
PLATELET # BLD AUTO: 27 10E3/UL (ref 150–450)
POTASSIUM SERPL-SCNC: 4 MMOL/L (ref 3.4–5.3)
PROT SERPL-MCNC: 6.2 G/DL (ref 6.4–8.3)
PROT/CREAT 24H UR: 0.09 MG/MG CR (ref 0–0.2)
PROTHROMBIN TIME: 22.5 SECONDS (ref 11.8–14.8)
RBC # BLD AUTO: 3.19 10E6/UL (ref 4.4–5.9)
RBC URINE: 2 /HPF
RVPLETH-%PRED-PRE: 108 %
RVPLETH-PRE: 2.6 L
RVPLETH-PRED: 2.38 L
SGAW-PRED: 0.2 1/CMH2O*S
SODIUM SERPL-SCNC: 138 MMOL/L (ref 135–145)
SP GR UR STRIP: 1.03 (ref 1–1.03)
SPECIMEN EXP DATE BLD: NORMAL
SQUAMOUS EPITHELIAL: 1 /HPF
SRAW-PRED: < 4.76 CMH2O*S
TLCPLETH-%PRED-PRE: 81 %
TLCPLETH-PRE: 6.06 L
TLCPLETH-PRED: 7.44 L
TRIGL SERPL-MCNC: 76 MG/DL
UROBILINOGEN UR STRIP-MCNC: 2 MG/DL
VA-%PRED-PRE: 88 %
VA-PRE: 5.95 L
VC-%PRED-PRE: 67 %
VC-PRE: 3.46 L
VC-PRED: 5.12 L
VIT D+METAB SERPL-MCNC: 25 NG/ML (ref 20–50)
WBC # BLD AUTO: 3.7 10E3/UL (ref 4–11)
WBC URINE: 1 /HPF

## 2025-08-12 PROCEDURE — 94726 PLETHYSMOGRAPHY LUNG VOLUMES: CPT | Performed by: INTERNAL MEDICINE

## 2025-08-12 PROCEDURE — 86886 COOMBS TEST INDIRECT TITER: CPT

## 2025-08-12 PROCEDURE — 99214 OFFICE O/P EST MOD 30 MIN: CPT | Performed by: SURGERY

## 2025-08-12 PROCEDURE — 82306 VITAMIN D 25 HYDROXY: CPT | Performed by: INTERNAL MEDICINE

## 2025-08-12 PROCEDURE — 99213 OFFICE O/P EST LOW 20 MIN: CPT | Performed by: SURGERY

## 2025-08-12 PROCEDURE — 94729 DIFFUSING CAPACITY: CPT | Performed by: INTERNAL MEDICINE

## 2025-08-12 PROCEDURE — 71046 X-RAY EXAM CHEST 2 VIEWS: CPT | Performed by: RADIOLOGY

## 2025-08-12 PROCEDURE — 80321 ALCOHOLS BIOMARKERS 1OR 2: CPT | Performed by: INTERNAL MEDICINE

## 2025-08-12 PROCEDURE — 76700 US EXAM ABDOM COMPLETE: CPT | Mod: XU | Performed by: RADIOLOGY

## 2025-08-12 PROCEDURE — 93975 VASCULAR STUDY: CPT | Mod: GC | Performed by: RADIOLOGY

## 2025-08-12 PROCEDURE — 94150 VITAL CAPACITY TEST: CPT | Performed by: INTERNAL MEDICINE

## 2025-08-12 PROCEDURE — 77080 DXA BONE DENSITY AXIAL: CPT | Performed by: INTERNAL MEDICINE

## 2025-08-12 PROCEDURE — 86900 BLOOD TYPING SEROLOGIC ABO: CPT

## 2025-08-12 PROCEDURE — 94375 RESPIRATORY FLOW VOLUME LOOP: CPT | Performed by: INTERNAL MEDICINE

## 2025-08-12 RX ORDER — LORATADINE 10 MG/1
1 TABLET ORAL
COMMUNITY

## 2025-08-12 RX ORDER — TRIAMCINOLONE ACETONIDE 1 MG/ML
LOTION TOPICAL 3 TIMES DAILY
COMMUNITY
Start: 2024-06-06

## 2025-08-12 RX ORDER — IXEKIZUMAB 80 MG/ML
INJECTION, SOLUTION SUBCUTANEOUS
COMMUNITY
Start: 2024-12-21

## 2025-08-12 RX ORDER — ESCITALOPRAM OXALATE 10 MG/1
TABLET ORAL
COMMUNITY

## 2025-08-12 RX ORDER — DOXEPIN HYDROCHLORIDE 10 MG/1
10 CAPSULE ORAL AT BEDTIME
COMMUNITY
Start: 2025-07-28

## 2025-08-12 RX ORDER — LISDEXAMFETAMINE DIMESYLATE 50 MG/1
50 CAPSULE ORAL DAILY
COMMUNITY
Start: 2025-08-05

## 2025-08-12 RX ORDER — CARVEDILOL 6.25 MG/1
TABLET ORAL
COMMUNITY
Start: 2024-03-06

## 2025-08-12 RX ORDER — CLOBETASOL PROPIONATE 0.5 MG/G
OINTMENT TOPICAL
COMMUNITY

## 2025-08-12 RX ORDER — VENLAFAXINE HYDROCHLORIDE 150 MG/1
150 CAPSULE, EXTENDED RELEASE ORAL DAILY
COMMUNITY

## 2025-08-12 RX ORDER — MIRTAZAPINE 15 MG/1
15 TABLET, FILM COATED ORAL AT BEDTIME
COMMUNITY

## 2025-08-12 RX ORDER — TAMSULOSIN HYDROCHLORIDE 0.4 MG/1
0.4 CAPSULE ORAL DAILY
COMMUNITY
Start: 2025-06-25

## 2025-08-12 RX ORDER — LORAZEPAM 1 MG/1
TABLET ORAL
COMMUNITY

## 2025-08-12 RX ORDER — HYDROXYZINE HYDROCHLORIDE 25 MG/1
TABLET, FILM COATED ORAL
COMMUNITY

## 2025-08-12 RX ORDER — CLOBETASOL PROPIONATE 0.5 MG/G
CREAM TOPICAL 2 TIMES DAILY
COMMUNITY
Start: 2025-02-03

## 2025-08-12 RX ORDER — BUSPIRONE HYDROCHLORIDE 5 MG/1
TABLET ORAL
COMMUNITY

## 2025-08-12 RX ORDER — DICYCLOMINE HYDROCHLORIDE 10 MG/1
CAPSULE ORAL
COMMUNITY

## 2025-08-12 RX ORDER — VANCOMYCIN HYDROCHLORIDE 125 MG/1
CAPSULE ORAL
COMMUNITY
Start: 2025-07-24

## 2025-08-12 RX ORDER — EPINEPHRINE 0.3 MG/.3ML
INJECTION SUBCUTANEOUS
COMMUNITY

## 2025-08-12 RX ORDER — MAGNESIUM GLUCONATE 30 MG(550)
99 TABLET ORAL
COMMUNITY

## 2025-08-12 RX ORDER — CETIRIZINE HYDROCHLORIDE 10 MG/1
10 TABLET ORAL DAILY
COMMUNITY

## 2025-08-13 LAB
ATRIAL RATE - MUSE: 84 BPM
DIASTOLIC BLOOD PRESSURE - MUSE: NORMAL MMHG
ETHYL SULFATE UR CFM-MCNC: <100 NG/ML
INTERPRETATION ECG - MUSE: NORMAL
Lab: <100 NG/ML
P AXIS - MUSE: 12 DEGREES
PR INTERVAL - MUSE: 172 MS
QRS DURATION - MUSE: 94 MS
QT - MUSE: 384 MS
QTC - MUSE: 453 MS
R AXIS - MUSE: -39 DEGREES
SYSTOLIC BLOOD PRESSURE - MUSE: NORMAL MMHG
T AXIS - MUSE: 45 DEGREES
VENTRICULAR RATE- MUSE: 84 BPM

## 2025-08-14 LAB
AMPHETAMINES SERPL QL SCN: NORMAL NG/ML
ANNOTATION COMMENT IMP: NORMAL
BARBITURATES SERPL QL SCN: NEGATIVE
BENZODIAZ SERPL QL SCN: NEGATIVE
BUPRENORPHINE SERPL-MCNC: NEGATIVE NG/ML
CANNABINOIDS SERPL QL SCN: NEGATIVE
COCAINE SERPL QL SCN: NEGATIVE
METHADONE SERPL QL SCN: NEGATIVE
METHAMPHET SERPL QL: NEGATIVE
OPIATES SERPL QL SCN: NEGATIVE
OXYCODONE SERPL QL: NEGATIVE
PCP SERPL QL SCN: NEGATIVE

## 2025-08-16 LAB
AMPHET SERPL-MCNC: 37 NG/ML
MDA SERPL-MCNC: <20 NG/ML
MDEA SERPL-MCNC: <20 NG/ML
MDMA SERPL-MCNC: <20 NG/ML
METHAMPHET SERPL-MCNC: <20 NG/ML

## 2025-08-19 ENCOUNTER — TELEPHONE (OUTPATIENT)
Dept: TRANSPLANT | Facility: CLINIC | Age: 65
End: 2025-08-19
Payer: COMMERCIAL

## 2025-08-19 DIAGNOSIS — A49.8 CLOSTRIDIUM DIFFICILE INFECTION: Primary | ICD-10-CM

## 2025-08-19 PROBLEM — K75.81 METABOLIC DYSFUNCTION-ASSOCIATED STEATOHEPATITIS (MASH): Status: ACTIVE | Noted: 2025-08-19

## 2025-08-19 LAB
COTININE SERPL-MCNC: <5 NG/ML
NICOTINE SERPL-MCNC: <5 NG/ML

## 2025-10-08 ENCOUNTER — PRE VISIT (OUTPATIENT)
Dept: CARDIOLOGY | Facility: CLINIC | Age: 65
End: 2025-10-08
Payer: COMMERCIAL